# Patient Record
Sex: MALE | Race: OTHER | HISPANIC OR LATINO | URBAN - METROPOLITAN AREA
[De-identification: names, ages, dates, MRNs, and addresses within clinical notes are randomized per-mention and may not be internally consistent; named-entity substitution may affect disease eponyms.]

---

## 2019-08-30 ENCOUNTER — EMERGENCY (EMERGENCY)
Facility: HOSPITAL | Age: 39
LOS: 1 days | Discharge: HOME | End: 2019-08-30
Attending: EMERGENCY MEDICINE | Admitting: EMERGENCY MEDICINE
Payer: SELF-PAY

## 2019-08-30 VITALS
TEMPERATURE: 98 F | HEART RATE: 60 BPM | SYSTOLIC BLOOD PRESSURE: 127 MMHG | RESPIRATION RATE: 16 BRPM | OXYGEN SATURATION: 98 % | DIASTOLIC BLOOD PRESSURE: 78 MMHG

## 2019-08-30 VITALS
DIASTOLIC BLOOD PRESSURE: 72 MMHG | TEMPERATURE: 99 F | WEIGHT: 250 LBS | OXYGEN SATURATION: 99 % | HEART RATE: 71 BPM | RESPIRATION RATE: 16 BRPM | SYSTOLIC BLOOD PRESSURE: 113 MMHG | HEIGHT: 70 IN

## 2019-08-30 LAB
ALBUMIN SERPL ELPH-MCNC: 3.4 G/DL — SIGNIFICANT CHANGE UP (ref 3.3–5)
ALP SERPL-CCNC: 93 U/L — SIGNIFICANT CHANGE UP (ref 40–120)
ALT FLD-CCNC: 37 U/L — SIGNIFICANT CHANGE UP (ref 12–78)
ANION GAP SERPL CALC-SCNC: 9 MMOL/L — SIGNIFICANT CHANGE UP (ref 5–17)
APPEARANCE UR: CLEAR — SIGNIFICANT CHANGE UP
APTT BLD: 35.8 SEC — SIGNIFICANT CHANGE UP (ref 28.5–37)
AST SERPL-CCNC: 27 U/L — SIGNIFICANT CHANGE UP (ref 15–37)
BACTERIA # UR AUTO: ABNORMAL
BASOPHILS # BLD AUTO: 0.06 K/UL — SIGNIFICANT CHANGE UP (ref 0–0.2)
BASOPHILS NFR BLD AUTO: 0.5 % — SIGNIFICANT CHANGE UP (ref 0–2)
BILIRUB SERPL-MCNC: 0.5 MG/DL — SIGNIFICANT CHANGE UP (ref 0.2–1.2)
BILIRUB UR-MCNC: NEGATIVE — SIGNIFICANT CHANGE UP
BUN SERPL-MCNC: 17 MG/DL — SIGNIFICANT CHANGE UP (ref 7–23)
CALCIUM SERPL-MCNC: 8.9 MG/DL — SIGNIFICANT CHANGE UP (ref 8.5–10.1)
CHLORIDE SERPL-SCNC: 106 MMOL/L — SIGNIFICANT CHANGE UP (ref 96–108)
CO2 SERPL-SCNC: 27 MMOL/L — SIGNIFICANT CHANGE UP (ref 22–31)
COLOR SPEC: YELLOW — SIGNIFICANT CHANGE UP
CREAT SERPL-MCNC: 1 MG/DL — SIGNIFICANT CHANGE UP (ref 0.5–1.3)
DIFF PNL FLD: NEGATIVE — SIGNIFICANT CHANGE UP
EOSINOPHIL # BLD AUTO: 0.21 K/UL — SIGNIFICANT CHANGE UP (ref 0–0.5)
EOSINOPHIL NFR BLD AUTO: 1.9 % — SIGNIFICANT CHANGE UP (ref 0–6)
EPI CELLS # UR: SIGNIFICANT CHANGE UP
GLUCOSE SERPL-MCNC: 84 MG/DL — SIGNIFICANT CHANGE UP (ref 70–99)
GLUCOSE UR QL: NEGATIVE — SIGNIFICANT CHANGE UP
HCT VFR BLD CALC: 40.7 % — SIGNIFICANT CHANGE UP (ref 39–50)
HGB BLD-MCNC: 13.3 G/DL — SIGNIFICANT CHANGE UP (ref 13–17)
IMM GRANULOCYTES NFR BLD AUTO: 0.4 % — SIGNIFICANT CHANGE UP (ref 0–1.5)
INR BLD: 0.97 RATIO — SIGNIFICANT CHANGE UP (ref 0.88–1.16)
KETONES UR-MCNC: ABNORMAL
LEUKOCYTE ESTERASE UR-ACNC: ABNORMAL
LYMPHOCYTES # BLD AUTO: 29.2 % — SIGNIFICANT CHANGE UP (ref 13–44)
LYMPHOCYTES # BLD AUTO: 3.28 K/UL — SIGNIFICANT CHANGE UP (ref 1–3.3)
MCHC RBC-ENTMCNC: 29 PG — SIGNIFICANT CHANGE UP (ref 27–34)
MCHC RBC-ENTMCNC: 32.7 GM/DL — SIGNIFICANT CHANGE UP (ref 32–36)
MCV RBC AUTO: 88.9 FL — SIGNIFICANT CHANGE UP (ref 80–100)
MONOCYTES # BLD AUTO: 0.88 K/UL — SIGNIFICANT CHANGE UP (ref 0–0.9)
MONOCYTES NFR BLD AUTO: 7.8 % — SIGNIFICANT CHANGE UP (ref 2–14)
NEUTROPHILS # BLD AUTO: 6.77 K/UL — SIGNIFICANT CHANGE UP (ref 1.8–7.4)
NEUTROPHILS NFR BLD AUTO: 60.2 % — SIGNIFICANT CHANGE UP (ref 43–77)
NITRITE UR-MCNC: NEGATIVE — SIGNIFICANT CHANGE UP
NRBC # BLD: 0 /100 WBCS — SIGNIFICANT CHANGE UP (ref 0–0)
PH UR: 6.5 — SIGNIFICANT CHANGE UP (ref 5–8)
PLATELET # BLD AUTO: 386 K/UL — SIGNIFICANT CHANGE UP (ref 150–400)
POTASSIUM SERPL-MCNC: 4.2 MMOL/L — SIGNIFICANT CHANGE UP (ref 3.5–5.3)
POTASSIUM SERPL-SCNC: 4.2 MMOL/L — SIGNIFICANT CHANGE UP (ref 3.5–5.3)
PROT SERPL-MCNC: 8 G/DL — SIGNIFICANT CHANGE UP (ref 6–8.3)
PROT UR-MCNC: NEGATIVE — SIGNIFICANT CHANGE UP
PROTHROM AB SERPL-ACNC: 11 SEC — SIGNIFICANT CHANGE UP (ref 10–12.9)
RBC # BLD: 4.58 M/UL — SIGNIFICANT CHANGE UP (ref 4.2–5.8)
RBC # FLD: 13.4 % — SIGNIFICANT CHANGE UP (ref 10.3–14.5)
RBC CASTS # UR COMP ASSIST: SIGNIFICANT CHANGE UP /HPF (ref 0–4)
SODIUM SERPL-SCNC: 142 MMOL/L — SIGNIFICANT CHANGE UP (ref 135–145)
SP GR SPEC: 1.01 — SIGNIFICANT CHANGE UP (ref 1.01–1.02)
UROBILINOGEN FLD QL: NEGATIVE — SIGNIFICANT CHANGE UP
WBC # BLD: 11.25 K/UL — HIGH (ref 3.8–10.5)
WBC # FLD AUTO: 11.25 K/UL — HIGH (ref 3.8–10.5)
WBC UR QL: SIGNIFICANT CHANGE UP

## 2019-08-30 PROCEDURE — 85610 PROTHROMBIN TIME: CPT

## 2019-08-30 PROCEDURE — 81001 URINALYSIS AUTO W/SCOPE: CPT

## 2019-08-30 PROCEDURE — 96374 THER/PROPH/DIAG INJ IV PUSH: CPT | Mod: XU

## 2019-08-30 PROCEDURE — 99284 EMERGENCY DEPT VISIT MOD MDM: CPT | Mod: 25

## 2019-08-30 PROCEDURE — 74177 CT ABD & PELVIS W/CONTRAST: CPT | Mod: 26

## 2019-08-30 PROCEDURE — 85027 COMPLETE CBC AUTOMATED: CPT

## 2019-08-30 PROCEDURE — 74177 CT ABD & PELVIS W/CONTRAST: CPT

## 2019-08-30 PROCEDURE — 99285 EMERGENCY DEPT VISIT HI MDM: CPT

## 2019-08-30 PROCEDURE — 96361 HYDRATE IV INFUSION ADD-ON: CPT

## 2019-08-30 PROCEDURE — 80053 COMPREHEN METABOLIC PANEL: CPT

## 2019-08-30 PROCEDURE — 85730 THROMBOPLASTIN TIME PARTIAL: CPT

## 2019-08-30 PROCEDURE — 36415 COLL VENOUS BLD VENIPUNCTURE: CPT

## 2019-08-30 RX ORDER — METRONIDAZOLE 500 MG
1 TABLET ORAL
Qty: 30 | Refills: 0
Start: 2019-08-30 | End: 2019-09-08

## 2019-08-30 RX ORDER — KETOROLAC TROMETHAMINE 30 MG/ML
30 SYRINGE (ML) INJECTION ONCE
Refills: 0 | Status: DISCONTINUED | OUTPATIENT
Start: 2019-08-30 | End: 2019-08-30

## 2019-08-30 RX ORDER — SODIUM CHLORIDE 9 MG/ML
1000 INJECTION INTRAMUSCULAR; INTRAVENOUS; SUBCUTANEOUS ONCE
Refills: 0 | Status: COMPLETED | OUTPATIENT
Start: 2019-08-30 | End: 2019-08-30

## 2019-08-30 RX ORDER — METRONIDAZOLE 500 MG
500 TABLET ORAL ONCE
Refills: 0 | Status: COMPLETED | OUTPATIENT
Start: 2019-08-30 | End: 2019-08-30

## 2019-08-30 RX ORDER — CIPROFLOXACIN LACTATE 400MG/40ML
1 VIAL (ML) INTRAVENOUS
Qty: 20 | Refills: 0
Start: 2019-08-30 | End: 2019-09-08

## 2019-08-30 RX ORDER — CIPROFLOXACIN LACTATE 400MG/40ML
500 VIAL (ML) INTRAVENOUS ONCE
Refills: 0 | Status: COMPLETED | OUTPATIENT
Start: 2019-08-30 | End: 2019-08-30

## 2019-08-30 RX ADMIN — Medication 500 MILLIGRAM(S): at 22:44

## 2019-08-30 RX ADMIN — SODIUM CHLORIDE 1000 MILLILITER(S): 9 INJECTION INTRAMUSCULAR; INTRAVENOUS; SUBCUTANEOUS at 19:55

## 2019-08-30 RX ADMIN — Medication 30 MILLIGRAM(S): at 19:55

## 2019-08-30 RX ADMIN — SODIUM CHLORIDE 1000 MILLILITER(S): 9 INJECTION INTRAMUSCULAR; INTRAVENOUS; SUBCUTANEOUS at 22:37

## 2019-08-30 RX ADMIN — Medication 30 MILLIGRAM(S): at 22:37

## 2019-08-30 NOTE — ED PROVIDER NOTE - OBJECTIVE STATEMENT
38 yo male no pmhx c/o 2 weeks of LLQ, intermittent, subjective fevers, nonradiating, moderate to severe, no nausea/vomiting/diarrhea, noticed some blood in stool today.  Took some tylenol for pain with no relief. No dizziness.

## 2019-08-30 NOTE — ED ADULT NURSE NOTE - OBJECTIVE STATEMENT
received pt stable awake alert oriented x3 no distress c/o lt flank pain on and off for 2 weeks pt evaluated and denies nausea and vomiting labs and urine sent to lab

## 2019-08-30 NOTE — ED PROVIDER NOTE - PATIENT PORTAL LINK FT
You can access the FollowMyHealth Patient Portal offered by [facility] by registering at the following website: [email or web address]. By joining [employer]s FollowMyHealth portal, you will also be able to view your health information using other applications (apps) compatible with our system.

## 2019-08-30 NOTE — ED PROVIDER NOTE - NS ED ROS FT

## 2019-08-30 NOTE — ED ADULT NURSE REASSESSMENT NOTE - NS ED NURSE REASSESS COMMENT FT1
pt reavaluated and medicated with toradol 30mg ivp as ordered and ivf infusing well and  po contrast tolerated well awaiting ct scan

## 2019-08-30 NOTE — ED ADULT NURSE NOTE - NSIMPLEMENTINTERV_GEN_ALL_ED
Implemented All Universal Safety Interventions:  [place] to call system. Call bell, personal items and telephone within reach. Instruct patient to call for assistance. Room bathroom lighting operational. Non-slip footwear when patient is off stretcher. Physically safe environment: no spills, clutter or unnecessary equipment. Stretcher in lowest position, wheels locked, appropriate side rails in place.

## 2019-08-30 NOTE — ED PROVIDER NOTE - PHYSICAL EXAMINATION
Gen: Alert, NAD  Head/eyes: NC/AT, PERRL, EOMI  ENT: airway patent  Neck: supple, no tenderness/meningismus/JVD, Trachea midline  Pulm/lung: Bilateral clear BS, normal resp effort, no wheeze/stridor/retractions  CV/heart: RRR, no M/R/G, +2 dist pulses (radial, pedal DP/PT, popliteal)  GI/Abd: soft, +LLQ ttp/ND, +BS, no guarding/rebound tenderness  Musculoskeletal: no edema/erythema/cyanosis, FROM in all extremities, no C/T/L spine ttp  Skin: no rash, no vesicles, no petechaie, no ecchymosis, no swelling  Neuro: AAOx3, CN 2-12 intact, normal sensation, 5/5 motor strength in all extremities, normal gait, no dysmetria

## 2019-08-30 NOTE — ED PROVIDER NOTE - NSFOLLOWUPINSTRUCTIONS_ED_ALL_ED_FT
1) Follow-up with your Primary Medical Doctor and Dr. Howard. Call today / next business day for prompt follow-up.  2) Return to Emergency room for any worsening or persistent pain, weakness, fever, or any other concerning symptoms.  3) See attached instruction sheets for additional information, including information regarding signs and symptoms to look out for, reasons to seek immediate care and other important instructions.  4) Take cipro 500mg every 12 hours for 10 days.  5) Take flagyl 500mg every 8 hours for 10 days.

## 2019-08-30 NOTE — ED ADULT NURSE REASSESSMENT NOTE - GENERAL PATIENT STATE
----- Message from Carolin Kaplan sent at 7/22/2019 10:04 AM CDT -----  Contact: pt  Pt would like to be called back regarding his ERCT     Pt can be reached at [phone]   comfortable appearance/cooperative

## 2023-03-18 ENCOUNTER — EMERGENCY (EMERGENCY)
Facility: HOSPITAL | Age: 43
LOS: 1 days | Discharge: HOME | End: 2023-03-18
Attending: INTERNAL MEDICINE | Admitting: INTERNAL MEDICINE
Payer: COMMERCIAL

## 2023-03-18 VITALS
HEART RATE: 69 BPM | SYSTOLIC BLOOD PRESSURE: 141 MMHG | OXYGEN SATURATION: 97 % | DIASTOLIC BLOOD PRESSURE: 85 MMHG | RESPIRATION RATE: 16 BRPM | TEMPERATURE: 98 F

## 2023-03-18 VITALS
OXYGEN SATURATION: 96 % | RESPIRATION RATE: 15 BRPM | HEIGHT: 70 IN | DIASTOLIC BLOOD PRESSURE: 90 MMHG | SYSTOLIC BLOOD PRESSURE: 150 MMHG | TEMPERATURE: 98 F | HEART RATE: 74 BPM | WEIGHT: 250 LBS

## 2023-03-18 PROCEDURE — 99053 MED SERV 10PM-8AM 24 HR FAC: CPT

## 2023-03-18 PROCEDURE — 73090 X-RAY EXAM OF FOREARM: CPT

## 2023-03-18 PROCEDURE — 73110 X-RAY EXAM OF WRIST: CPT

## 2023-03-18 PROCEDURE — 73110 X-RAY EXAM OF WRIST: CPT | Mod: 26,RT

## 2023-03-18 PROCEDURE — 99284 EMERGENCY DEPT VISIT MOD MDM: CPT | Mod: 25

## 2023-03-18 PROCEDURE — 73090 X-RAY EXAM OF FOREARM: CPT | Mod: 26,LT

## 2023-03-18 PROCEDURE — 99284 EMERGENCY DEPT VISIT MOD MDM: CPT

## 2023-03-18 RX ORDER — OXYCODONE AND ACETAMINOPHEN 5; 325 MG/1; MG/1
1 TABLET ORAL ONCE
Refills: 0 | Status: DISCONTINUED | OUTPATIENT
Start: 2023-03-18 | End: 2023-03-18

## 2023-03-18 RX ADMIN — OXYCODONE AND ACETAMINOPHEN 1 TABLET(S): 5; 325 TABLET ORAL at 05:48

## 2023-03-18 NOTE — ED ADULT NURSE NOTE - DISTAL EXTREMITY COLOR
1st call unable to leave a vm please try again or of mom calls back schedule 20 min asthma  f/u     color consistent with ethnicity/race

## 2023-03-18 NOTE — ED ADULT NURSE NOTE - CHIEF COMPLAINT QUOTE
Pt fell off the truck and injured right arm. Pt went to a hospital in [place] and they gave him a splint but pt states it is too tight and he feels like he is losing circulation. Pt is able to wiggle fingers and skin perfusion is good.

## 2023-03-18 NOTE — ED PROVIDER NOTE - MUSCULOSKELETAL, MLM
left wrist ,  The ace bandage was removed , the splint was loosened and the ace reapplied with good patient comfort,

## 2023-03-18 NOTE — ED ADULT TRIAGE NOTE - CHIEF COMPLAINT QUOTE
Pt fell off the truck and injured right arm. Pt went to a hospital in [place] and they gave him a splint but pt states it is too tight and he feels like he is losing circulation. Pt is able to wiggle fingers and skin perfusion is good.

## 2023-03-18 NOTE — ED PROVIDER NOTE - CARE PROVIDER_API CALL
MARQUITA CRAMER  Orthopaedic Surgery  [address], [address], [place] [address]  Phone: [phone]  Fax: ([phone]  Follow Up Time: 1-3 Days    Marquita Cramer)  Orthopedics  [address], [address]  [place], [place] [id number]  Phone: [phone]  Fax: [phone]  Follow Up Time: 1-3 Days

## 2023-03-18 NOTE — ED ADULT NURSE NOTE - SCORE
Urinalysis today  Take tylenol up to 3 g daily as needed for shoulder pain  If no improvement, we will refer to physical therapy  Continue the same medication  Continue the blood thinner  For precaution  Stay hydrated  Call if any questions or concerns  See me in 3 months for wellness examination earlier if needed.  With urine microalbumin.  
1

## 2023-03-18 NOTE — ED PROVIDER NOTE - PATIENT PORTAL LINK FT
You can access the FollowMyHealth Patient Portal offered by [facility] by registering at the following website: [email or web address]. By joining [employer]’s FollowMyHealth portal, you will also be able to view your health information using other applications (apps) compatible with our system.

## 2023-03-18 NOTE — ED PROVIDER NOTE - CLINICAL SUMMARY MEDICAL DECISION MAKING FREE TEXT BOX
41 y/o male with recent left wrist fx, C/O tight splint, The ace bandage was removed , the splint was loosened and the ace reapplied with good patient comfort, X-R determined good anatomic alignment , discharged and referred to O/P ortho

## 2023-03-18 NOTE — ED PROVIDER NOTE - PROVIDER TOKENS
PROVIDER:[TOKEN:[[id number]],FOLLOWUP:[1-3 Days]],PROVIDER:[TOKEN:[[id number]],FOLLOWUP:[1-3 Days]]

## 2023-03-18 NOTE — ED PROVIDER NOTE - OBJECTIVE STATEMENT
Pt fell off the truck and injured right arm. Pt went to a hospital in [place] and they gave him a splint but pt states it is too tight and he feels like he is losing circulation. Pt is able to wiggle fingers and skin perfusion is good.  arm pain/injury 43 y/o male with recent left wrist fx, C/O tight splint, The ace bandage was removed , the splint was loosened and the ace reapplied with good patient comfort, X-R determined good anatomic alignment

## 2023-03-18 NOTE — ED PROVIDER NOTE - NSFOLLOWUPINSTRUCTIONS_ED_ALL_ED_FT
manten tu brazo elevado sobre el faye y sobre la almohada   aplicar bolsa de hielo  no quites el yeso   seguimiento con ortopedista  use la eslinga del brazo para mantenerse elevada

## 2023-03-18 NOTE — ED ADULT NURSE NOTE - OBJECTIVE STATEMENT
pt a/o x 4 with a calm affect c/o right arm pain after breaking arm and having it placed in a sling in [place] recently.  pt states it feels very tight, pulses are present and skin color is normal with no loss of sensation.  MD at bedside to re-splint arm.

## 2023-03-29 ENCOUNTER — APPOINTMENT (OUTPATIENT)
Dept: ORTHOPEDIC SURGERY | Facility: CLINIC | Age: 43
End: 2023-03-29
Payer: OTHER MISCELLANEOUS

## 2023-03-29 ENCOUNTER — NON-APPOINTMENT (OUTPATIENT)
Age: 43
End: 2023-03-29

## 2023-03-29 VITALS
WEIGHT: 250 LBS | HEIGHT: 70 IN | SYSTOLIC BLOOD PRESSURE: 143 MMHG | DIASTOLIC BLOOD PRESSURE: 88 MMHG | HEART RATE: 63 BPM | BODY MASS INDEX: 35.79 KG/M2

## 2023-03-29 PROCEDURE — 73110 X-RAY EXAM OF WRIST: CPT | Mod: RT

## 2023-03-29 PROCEDURE — 99204 OFFICE O/P NEW MOD 45 MIN: CPT

## 2023-03-29 NOTE — DISCUSSION/SUMMARY
[FreeTextEntry1] : He has findings consistent with a highly comminuted intra-articular right distal radius fracture after an injury at work on 3/17/2023.  The fracture was reduced.  There is significant comminution and intra-articular displacement and this is a highly unstable fracture.\par \par I had a discussion with the patient regarding today's visit, the prognosis of this diagnosis, and treatment recommendations and options.\par \par At this time, we discussed treatment options of operative and nonoperative management. I did tell him that given the comminution and displacement of the fracture, I would recommend ORIF.  This is based upon his age and the comminution and intra-articular displacement.  He would like to think about this but he understands that I have recommended surgery.  I told him that I would recommend we obtain authorization through Workmen's Compensation and then discuss this further.  He is in agreement.\par \par In the interim, he was instructed on maintenance of the sugar tong splint and on proper protection and splint care. Furthermore, he was instructed to begin flexion and extension exercises of the digits as he has developed a great deal of stiffness to the digits since the injury occurred and is losing motion/function. Finally, at his request due to pain, he was prescribed course of Celebrex 200 mg, BID with meals. I warned about potential GI side effects.\par \par He has agreed to the above plan of management and has expressed full understanding.  All questions were fully answered to their satisfaction. \par \par My cumulative time spent on this visit included: Preparation for the visit, review of the medical records, review of pertinent diagnostic studies, examination and counseling of the patient on the above diagnosis, treatment plan and prognosis, orders of diagnostic tests, medication and/or appropriate procedures and documentation in the medical records of today's visit.

## 2023-03-29 NOTE — END OF VISIT
[FreeTextEntry3] : This note was written by Andreia Paulino on 03/29/2023 acting solely as a scribe for Dr. Judson Pelletier.\par  \par All medical record entries made by the Scribe were at my, Dr. Judson Pelletier, direction and personally dictated by me on 03/29/2023. I have personally reviewed the chart and agree that the record accurately reflects my personal performance of the history, physical exam, assessment and plan.

## 2023-03-29 NOTE — ADDENDUM
[FreeTextEntry1] : I, Andreia Paulino, acted solely as a scribe for Dr. Pelletier on this date on 03/29/2023.

## 2023-03-29 NOTE — HISTORY OF PRESENT ILLNESS
[Right] : right hand dominant [Has the patient missed work because of the injury/illness?] : The patient has missed work because of the injury/illness. [No] : The patient is currently not working. [FreeTextEntry1] : \par Workmen's Compensation case\par \par Date of accident: 03/17/2023\par Working: No\par Degree of Disability: 100%\par \par He comes in today for evaluation of a right wrist injury sustained at work on 3/17/23 while in [place]. He was in his truck attempting to grab a case from a wood pallet and accidentally stepped on the wood, causing him to trip and fall. He landed on his hand resulting in the injury. He was seen that same day at [facility] in [place], [place]. He was closed reduced and splinted. He returned to [place] and the next day presented to [place] ED due to discomfort in the sugar tong splint. He complains of a throbbing pain as well as numbness and tingling. He has swelling to the digits and limited digital motion. He rates his pain as a 6 out of 10 at this time.  [FreeTextEntry2] : He is a .

## 2023-03-29 NOTE — PHYSICAL EXAM
[de-identified] : - Constitutional: This is a male in no obvious distress.  \par - Psych: Patient is alert and oriented to person, place and time.  Patient has a normal mood and affect.\par - Cardiovascular: Normal pulses throughout the upper extremities.  No significant varicosities are noted in the upper extremities. \par - Neuro: Strength and sensation are intact throughout the upper extremities.  Patient has normal coordination.\par - Respiratory:  Patient exhibits no evidence of shortness of breath or difficulty breathing.\par - Skin: No rashes, lesions, or other abnormalities are noted in the upper extremities.\par \par --- \par \par Examination of his right wrist and hand demonstrates a sugar-tong splint.  He has swelling of the digits.  His limitation of flexion and extension of the digits.  He has intact sensation to light touch distally along the radial, ulnar and median nerve distributions. [de-identified] : PA, lateral, and oblique radiographs of the right wrist demonstrate a comminuted intra-articular distal radius fracture.  There is significant comminution and a displaced intra-articular fragment ulnarly.

## 2023-04-05 ENCOUNTER — APPOINTMENT (OUTPATIENT)
Dept: ORTHOPEDIC SURGERY | Facility: CLINIC | Age: 43
End: 2023-04-05
Payer: OTHER MISCELLANEOUS

## 2023-04-05 PROCEDURE — 99214 OFFICE O/P EST MOD 30 MIN: CPT

## 2023-04-05 PROCEDURE — 73110 X-RAY EXAM OF WRIST: CPT | Mod: RT

## 2023-04-05 NOTE — PHYSICAL EXAM
[de-identified] : - Constitutional: This is a male in no obvious distress.  \par - Psych: Patient is alert and oriented to person, place and time.  Patient has a normal mood and affect.\par - Cardiovascular: Normal pulses throughout the upper extremities.  No significant varicosities are noted in the upper extremities. \par - Neuro: Strength and sensation are intact throughout the upper extremities.  Patient has normal coordination.\par - Respiratory:  Patient exhibits no evidence of shortness of breath or difficulty breathing.\par - Skin: No rashes, lesions, or other abnormalities are noted in the upper extremities.\par \par --- \par \par Examination of his right wrist and hand demonstrates a sugar-tong splint.  He has decreased swelling of the digits.  His improved flexion and extension of the digits.  He has intact sensation to light touch distally along the radial, ulnar and median nerve distributions. [de-identified] : Repeat PA, lateral, and oblique radiographs of the right wrist demonstrate a comminuted intra-articular distal radius fracture.  There is significant comminution and a displaced intra-articular fragment ulnarly.

## 2023-04-05 NOTE — DISCUSSION/SUMMARY
[FreeTextEntry1] : I had a discussion regarding today's visit, the diagnosis and treatment recommendations and options.  We also discussed changes since the last visit.  At this time, I again discussed the xrays in great detail with him and told him that I would recommend ORIF, given the displacement and comminution of the fracture. I told him that the outcome will be significantly better with surgery than without, again given the nature of the injury. We discussed the postoperative protocols and expected recovery, to which I told him he may require cast immobilization postoperatively and may be out of work for at least 2 months. He stated he understands and has agreed to proceed with ORIF right distal radius fracture. Finally, I did tell him that worker's compensation has not yet authorized the surgery but that my office will again reach again and will notify him when the surgery is authorized.  We will call Workmen's Compensation, as this is relatively emergent.\par \par -  The nature and purposes of the operation/procedure was discussed in detail.  I discussed the surgical procedure in detail, as well as the expected postoperative recovery and outcome\par -  Possible risks, benefits, and complications (from known and unknown causes) of the procedure were discussed in detail.  \par -  Possible non-operative alternatives to the proposed treatment were discussed in detail.  \par -  He was told that possible risks/complications include, but are not limited to:  Infection, nerve or vessel injury, stiffness, painful scar, poor outcome, need for additional surgical procedures, and other unforeseen complications. I also discussed additional potential risks inherent in ORIF of a distal radius fracture with a volar plate. The patient understands that there is a risk that the plate may need to be removed in the future, if it results in pain or other hardware-related problems. I also discussed the potential of tendon related problems secondary to volar plates, including, but not limited to, tendinitis, tendinopathy, and even tendon rupture at a late date.  \par -  Finally, the possibility of an "unsuccessful outcome," despite "successful surgery," was discussed with the patient.  \par -  The patient fully understands these risks and wishes to proceed.  \par -  I had a lengthy discussion with the patient regarding today's visit, the diagnosis, and my surgical treatment recommendations.  The patient has agreed to this plan of management and has expressed full understanding.  All questions were fully answered to the patient's satisfaction. \par \par My cumulative time spent on today's visit was greater than 30 minutes and included: Preparation for the visit, review of the medical records, review of pertinent diagnostic studies, examination and counseling of the patient on the above diagnosis, treatment plan and prognosis, orders of diagnostic tests, medications and/or appropriate procedures and documentation in the medical records of today's visit.

## 2023-04-05 NOTE — END OF VISIT
[FreeTextEntry3] : This note was written by Andreia Paulino on 04/05/2023 acting solely as a scribe for Dr. Judson Pelletier.\par  \par All medical record entries made by the Scribe were at my, Dr. Judson Pelletier, direction and personally dictated by me on 04/05/2023. I have personally reviewed the chart and agree that the record accurately reflects my personal performance of the history, physical exam, assessment and plan.

## 2023-04-05 NOTE — HISTORY OF PRESENT ILLNESS
[Right] : right hand dominant [Has the patient missed work because of the injury/illness?] : The patient has missed work because of the injury/illness. [No] : The patient is currently not working. [FreeTextEntry1] : \par Workmen's Compensation case\par \par Date of accident: 03/17/2023\par Working: No\par Degree of Disability: 100%\par \par Follow-up regarding right distal radius fracture after an accident at work on 3/17/2023.  See note from when he was seen in the office 1 week ago.  I recommended open reduction and internal fixation.  He was not certain whether or not he wanted to proceed with surgery so I recommended requesting authorization for surgery and following up today.\par \par He is having continued pain, which is exacerbated with use. He notes he is still unsure if he would like surgery and would like to have the wrist xray'd again today to see how things look. He rates his pain as a 6 out of 10 at this time. [FreeTextEntry2] : He is a .

## 2023-04-10 ENCOUNTER — NON-APPOINTMENT (OUTPATIENT)
Age: 43
End: 2023-04-10

## 2023-04-11 ENCOUNTER — OUTPATIENT (OUTPATIENT)
Dept: OUTPATIENT SERVICES | Facility: HOSPITAL | Age: 43
LOS: 1 days | End: 2023-04-11
Payer: COMMERCIAL

## 2023-04-11 VITALS
TEMPERATURE: 98 F | DIASTOLIC BLOOD PRESSURE: 90 MMHG | WEIGHT: 300.05 LBS | SYSTOLIC BLOOD PRESSURE: 150 MMHG | HEIGHT: 70 IN | OXYGEN SATURATION: 98 % | HEART RATE: 82 BPM | RESPIRATION RATE: 12 BRPM

## 2023-04-11 DIAGNOSIS — S52.501A UNSPECIFIED FRACTURE OF THE LOWER END OF RIGHT RADIUS, INITIAL ENCOUNTER FOR CLOSED FRACTURE: ICD-10-CM

## 2023-04-11 DIAGNOSIS — Z01.818 ENCOUNTER FOR OTHER PREPROCEDURAL EXAMINATION: ICD-10-CM

## 2023-04-11 LAB
ALBUMIN SERPL ELPH-MCNC: 4 G/DL — SIGNIFICANT CHANGE UP (ref 3.3–5)
ALP SERPL-CCNC: 99 U/L — SIGNIFICANT CHANGE UP (ref 30–120)
ALT FLD-CCNC: 72 U/L DA — HIGH (ref 10–60)
ANION GAP SERPL CALC-SCNC: 12 MMOL/L — SIGNIFICANT CHANGE UP (ref 5–17)
AST SERPL-CCNC: 39 U/L — SIGNIFICANT CHANGE UP (ref 10–40)
BILIRUB SERPL-MCNC: 0.7 MG/DL — SIGNIFICANT CHANGE UP (ref 0.2–1.2)
BUN SERPL-MCNC: 11 MG/DL — SIGNIFICANT CHANGE UP (ref 7–23)
CALCIUM SERPL-MCNC: 9.5 MG/DL — SIGNIFICANT CHANGE UP (ref 8.4–10.5)
CHLORIDE SERPL-SCNC: 102 MMOL/L — SIGNIFICANT CHANGE UP (ref 96–108)
CO2 SERPL-SCNC: 27 MMOL/L — SIGNIFICANT CHANGE UP (ref 22–31)
CREAT SERPL-MCNC: 0.81 MG/DL — SIGNIFICANT CHANGE UP (ref 0.5–1.3)
EGFR: 112 ML/MIN/1.73M2 — SIGNIFICANT CHANGE UP
GLUCOSE SERPL-MCNC: 98 MG/DL — SIGNIFICANT CHANGE UP (ref 70–99)
HCT VFR BLD CALC: 44.6 % — SIGNIFICANT CHANGE UP (ref 39–50)
HGB BLD-MCNC: 14.6 G/DL — SIGNIFICANT CHANGE UP (ref 13–17)
MCHC RBC-ENTMCNC: 29 PG — SIGNIFICANT CHANGE UP (ref 27–34)
MCHC RBC-ENTMCNC: 32.7 GM/DL — SIGNIFICANT CHANGE UP (ref 32–36)
MCV RBC AUTO: 88.7 FL — SIGNIFICANT CHANGE UP (ref 80–100)
NRBC # BLD: 0 /100 WBCS — SIGNIFICANT CHANGE UP (ref 0–0)
PLATELET # BLD AUTO: 405 K/UL — HIGH (ref 150–400)
POTASSIUM SERPL-MCNC: 3.8 MMOL/L — SIGNIFICANT CHANGE UP (ref 3.5–5.3)
POTASSIUM SERPL-SCNC: 3.8 MMOL/L — SIGNIFICANT CHANGE UP (ref 3.5–5.3)
PROT SERPL-MCNC: 9.4 G/DL — HIGH (ref 6–8.3)
RBC # BLD: 5.03 M/UL — SIGNIFICANT CHANGE UP (ref 4.2–5.8)
RBC # FLD: 13 % — SIGNIFICANT CHANGE UP (ref 10.3–14.5)
SODIUM SERPL-SCNC: 141 MMOL/L — SIGNIFICANT CHANGE UP (ref 135–145)
WBC # BLD: 9.24 K/UL — SIGNIFICANT CHANGE UP (ref 3.8–10.5)
WBC # FLD AUTO: 9.24 K/UL — SIGNIFICANT CHANGE UP (ref 3.8–10.5)

## 2023-04-11 PROCEDURE — 93010 ELECTROCARDIOGRAM REPORT: CPT

## 2023-04-11 PROCEDURE — 36415 COLL VENOUS BLD VENIPUNCTURE: CPT

## 2023-04-11 PROCEDURE — 85027 COMPLETE CBC AUTOMATED: CPT

## 2023-04-11 PROCEDURE — 80053 COMPREHEN METABOLIC PANEL: CPT

## 2023-04-11 PROCEDURE — 93005 ELECTROCARDIOGRAM TRACING: CPT

## 2023-04-11 PROCEDURE — G0463: CPT

## 2023-04-11 NOTE — H&P PST ADULT - MUSCULOSKELETAL COMMENTS
right forearm splinted; able to move all right fingers right forearm fracture; splinted arm supported by shoulder sling

## 2023-04-11 NOTE — H&P PST ADULT - HISTORY OF PRESENT ILLNESS
43 yo male reports fall injury 3/17/2023 which resulted in right distal radius fracture.  He reports pain and swelling and is able to move all fingers.  He is scheduled for open reduction internal fixation of displaced intra-articular right distal radius fracture with greater than 3 intra-articular fragments on 4/13/2023 @ [facility].

## 2023-04-11 NOTE — H&P PST ADULT - MUSCULOSKELETAL
details… no joint swelling/no joint erythema/no joint warmth/no calf tenderness/decreased ROM due to pain/no chest wall tenderness

## 2023-04-11 NOTE — H&P PST ADULT - NSANTHOSAYNRD_GEN_A_CORE
No. EN screening performed.  STOP BANG Legend: 0-2 = LOW Risk; 3-4 = INTERMEDIATE Risk; 5-8 = HIGH Risk

## 2023-04-11 NOTE — H&P PST ADULT - NSICDXFAMILYHX_GEN_ALL_CORE_FT
FAMILY HISTORY:  Mother  Still living? Yes, Estimated age: Age Unknown  Family history of ovarian cancer, Age at diagnosis: Age Unknown

## 2023-04-11 NOTE — H&P PST ADULT - ASSESSMENT
43 yo male is scheduled for open reduction internal fixation of displaced intra-articular right distal radius fracture with greater than 3 intra-articular fragments on 4/13/2023

## 2023-04-11 NOTE — H&P PST ADULT - NSICDXPASTMEDICALHX_GEN_ALL_CORE_FT
PAST MEDICAL HISTORY:  Distal radius fracture, right     Morbid obesity with BMI of 40.0-44.9, adult     Uses Kiswahili as primary spoken language

## 2023-04-11 NOTE — H&P PST ADULT - PROBLEM SELECTOR PLAN 1
Open reduction internal fixation of displaced intra-articular right distal radius fracture with greater than 3 intra-articular fragments is planned for 4/13/2023  Diagnostic testing performed  Patient states he does not have a PCP for medical clearance and Dr Pelletier's office was notified.  Pre op instructions were reviewed using   Best wishes offered
bel smith

## 2023-04-12 ENCOUNTER — TRANSCRIPTION ENCOUNTER (OUTPATIENT)
Age: 43
End: 2023-04-12

## 2023-04-13 ENCOUNTER — OUTPATIENT (OUTPATIENT)
Dept: OUTPATIENT SERVICES | Facility: HOSPITAL | Age: 43
LOS: 1 days | Discharge: HOME | End: 2023-04-13
Payer: COMMERCIAL

## 2023-04-13 ENCOUNTER — TRANSCRIPTION ENCOUNTER (OUTPATIENT)
Age: 43
End: 2023-04-13

## 2023-04-13 ENCOUNTER — APPOINTMENT (OUTPATIENT)
Dept: ORTHOPEDIC SURGERY | Facility: HOSPITAL | Age: 43
End: 2023-04-13

## 2023-04-13 VITALS
OXYGEN SATURATION: 97 % | HEIGHT: 70 IN | HEART RATE: 69 BPM | RESPIRATION RATE: 18 BRPM | DIASTOLIC BLOOD PRESSURE: 74 MMHG | TEMPERATURE: 97 F | SYSTOLIC BLOOD PRESSURE: 123 MMHG | WEIGHT: 304.24 LBS

## 2023-04-13 VITALS
DIASTOLIC BLOOD PRESSURE: 74 MMHG | OXYGEN SATURATION: 95 % | RESPIRATION RATE: 16 BRPM | HEART RATE: 84 BPM | SYSTOLIC BLOOD PRESSURE: 121 MMHG

## 2023-04-13 DIAGNOSIS — S52.501A UNSPECIFIED FRACTURE OF THE LOWER END OF RIGHT RADIUS, INITIAL ENCOUNTER FOR CLOSED FRACTURE: ICD-10-CM

## 2023-04-13 DIAGNOSIS — Z01.818 ENCOUNTER FOR OTHER PREPROCEDURAL EXAMINATION: ICD-10-CM

## 2023-04-13 PROCEDURE — C1889: CPT

## 2023-04-13 PROCEDURE — 25575 OPTX RDL&ULN SHFT FX RDS&ULN: CPT | Mod: AS,RT

## 2023-04-13 PROCEDURE — C1713: CPT

## 2023-04-13 PROCEDURE — 25609 OPTX DST RD XART FX/EP SEP3+: CPT | Mod: RT

## 2023-04-13 PROCEDURE — 76000 FLUOROSCOPY <1 HR PHYS/QHP: CPT

## 2023-04-13 PROCEDURE — 99244 OFF/OP CNSLTJ NEW/EST MOD 40: CPT

## 2023-04-13 DEVICE — SCREW CORT 2.5X20MM: Type: IMPLANTABLE DEVICE | Site: RIGHT | Status: FUNCTIONAL

## 2023-04-13 DEVICE — SCREW CORT 2.5X13MM: Type: IMPLANTABLE DEVICE | Site: RIGHT | Status: FUNCTIONAL

## 2023-04-13 DEVICE — SCREW CORT 2.5X18MM: Type: IMPLANTABLE DEVICE | Site: RIGHT | Status: FUNCTIONAL

## 2023-04-13 DEVICE — SCREW CORT 2.5X14MM: Type: IMPLANTABLE DEVICE | Site: RIGHT | Status: FUNCTIONAL

## 2023-04-13 DEVICE — SCREW TRILOCK 2.5X20MM: Type: IMPLANTABLE DEVICE | Site: RIGHT | Status: FUNCTIONAL

## 2023-04-13 DEVICE — PLATE TRILOCK DIST RAD VOLAR NRRW 12H RT: Type: IMPLANTABLE DEVICE | Site: RIGHT | Status: FUNCTIONAL

## 2023-04-13 DEVICE — SCREW TRILOCK 2.5X14MM: Type: IMPLANTABLE DEVICE | Site: RIGHT | Status: FUNCTIONAL

## 2023-04-13 DEVICE — SCREW TRILOCK 2.5X18MM: Type: IMPLANTABLE DEVICE | Site: RIGHT | Status: FUNCTIONAL

## 2023-04-13 DEVICE — SCREW TRILOCK 2.5X22MM: Type: IMPLANTABLE DEVICE | Site: RIGHT | Status: FUNCTIONAL

## 2023-04-13 DEVICE — K-WIRE MEDARTIS (SMOOTH) SINGLE TROCAR 1.6MM X 150MM: Type: IMPLANTABLE DEVICE | Site: RIGHT | Status: FUNCTIONAL

## 2023-04-13 DEVICE — IMPLANTABLE DEVICE: Type: IMPLANTABLE DEVICE | Site: RIGHT | Status: FUNCTIONAL

## 2023-04-13 RX ORDER — CHLORHEXIDINE GLUCONATE 213 G/1000ML
1 SOLUTION TOPICAL ONCE
Refills: 0 | Status: COMPLETED | OUTPATIENT
Start: 2023-04-13 | End: 2023-04-13

## 2023-04-13 RX ORDER — ONDANSETRON 8 MG/1
4 TABLET, FILM COATED ORAL ONCE
Refills: 0 | Status: DISCONTINUED | OUTPATIENT
Start: 2023-04-13 | End: 2023-04-13

## 2023-04-13 RX ORDER — HYDROMORPHONE HYDROCHLORIDE 2 MG/ML
0.5 INJECTION INTRAMUSCULAR; INTRAVENOUS; SUBCUTANEOUS
Refills: 0 | Status: DISCONTINUED | OUTPATIENT
Start: 2023-04-13 | End: 2023-04-13

## 2023-04-13 RX ORDER — APREPITANT 80 MG/1
40 CAPSULE ORAL ONCE
Refills: 0 | Status: COMPLETED | OUTPATIENT
Start: 2023-04-13 | End: 2023-04-13

## 2023-04-13 RX ORDER — CEPHALEXIN 500 MG
1 CAPSULE ORAL
Qty: 8 | Refills: 0
Start: 2023-04-13 | End: 2023-04-14

## 2023-04-13 RX ORDER — OXYCODONE AND ACETAMINOPHEN 5; 325 MG/1; MG/1
1 TABLET ORAL
Qty: 20 | Refills: 0
Start: 2023-04-13

## 2023-04-13 RX ORDER — CEFAZOLIN SODIUM 1 G
3000 VIAL (EA) INJECTION ONCE
Refills: 0 | Status: COMPLETED | OUTPATIENT
Start: 2023-04-13 | End: 2023-04-13

## 2023-04-13 RX ORDER — SODIUM CHLORIDE 9 MG/ML
1000 INJECTION, SOLUTION INTRAVENOUS
Refills: 0 | Status: DISCONTINUED | OUTPATIENT
Start: 2023-04-13 | End: 2023-04-13

## 2023-04-13 RX ORDER — ACETAMINOPHEN 500 MG
1000 TABLET ORAL ONCE
Refills: 0 | Status: COMPLETED | OUTPATIENT
Start: 2023-04-13 | End: 2023-04-13

## 2023-04-13 RX ORDER — HYDROMORPHONE HYDROCHLORIDE 2 MG/ML
0.25 INJECTION INTRAMUSCULAR; INTRAVENOUS; SUBCUTANEOUS
Refills: 0 | Status: DISCONTINUED | OUTPATIENT
Start: 2023-04-13 | End: 2023-04-13

## 2023-04-13 RX ORDER — CELECOXIB 200 MG/1
1 CAPSULE ORAL
Qty: 10 | Refills: 0
Start: 2023-04-13

## 2023-04-13 RX ADMIN — CHLORHEXIDINE GLUCONATE 1 APPLICATION(S): 213 SOLUTION TOPICAL at 12:37

## 2023-04-13 RX ADMIN — APREPITANT 40 MILLIGRAM(S): 80 CAPSULE ORAL at 12:37

## 2023-04-13 NOTE — ASU DISCHARGE PLAN (ADULT/PEDIATRIC) - PROVIDER TOKENS
PROVIDER:[TOKEN:[[id number]],SCHEDULEDAPPT:[04/21/2023]] PROVIDER:[TOKEN:[[id number]],SCHEDULEDAPPT:[04/19/2023]]

## 2023-04-13 NOTE — ASU PATIENT PROFILE, ADULT - FALL HARM RISK - RISK INTERVENTIONS

## 2023-04-13 NOTE — ASU DISCHARGE PLAN (ADULT/PEDIATRIC) - ASU DC SPECIAL INSTRUCTIONSFT
Keep splint intact and clean  sling for comfort Keep splint intact and clean until seen in the office  sling for comfort
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
## 2023-04-13 NOTE — ASU DISCHARGE PLAN (ADULT/PEDIATRIC) - CARE PROVIDER_API CALL
Judson Pelletier)  Orthopaedic Surgery; Surgery of the Hand  [address], [facility] [address]  [place] [address]  Phone: [phone]  Fax: [phone]  Scheduled Appointment: 04/21/2023   Judson Pelletier)  Orthopaedic Surgery; Surgery of the Hand  [address] [facility]  [place] [address]  Phone: [phone]  Fax: [phone]  Scheduled Appointment: 04/19/2023

## 2023-04-13 NOTE — CONSULT NOTE ADULT - ASSESSMENT
R distal radius fracture  - planned ORIF today   - No absolute contraindications for surgery, patient is medically clear to proceed  - pain control as per ortho team  - IV fluid and postop antibiotics as per ortho team  - PT/OT consult  - VTE prophylaxis as per ortho     R distal radius fracture  - planned ORIF today   - No absolute contraindications for surgery, patient is medically clear to proceed  - pain control as per ortho team  - IV fluid and postop antibiotics as per ortho team  - PT/OT consult  - VTE prophylaxis as per ortho    Left posterior fasicular block  - asymptomatic, incidental finding on EKG  - no contraindication for surgery, patient has no deficiency in METS  - patient should establish care with a PCP and have outpatient lipid panel and proper followup for weight loss

## 2023-04-13 NOTE — ASU PATIENT PROFILE, ADULT - NSICDXPASTMEDICALHX_GEN_ALL_CORE_FT
PAST MEDICAL HISTORY:  Distal radius fracture, right     Morbid obesity with BMI of 40.0-44.9, adult     Uses Icelandic as primary spoken language

## 2023-04-13 NOTE — BRIEF OPERATIVE NOTE - NSICDXBRIEFPROCEDURE_GEN_ALL_CORE_FT
PROCEDURES:  Open reduction and internal fixation of fracture of right distal radius and ulna 13-Apr-2023 12:37:21  Marti Fajardo

## 2023-04-13 NOTE — ASU DISCHARGE PLAN (ADULT/PEDIATRIC) - NS MD DC FALL RISK RISK
For information on Fall & Injury Prevention, visit: [email or web address] OR  [email or web address] OR  https://www.cdc.gov/steadi/patient.html

## 2023-04-19 ENCOUNTER — APPOINTMENT (OUTPATIENT)
Dept: ORTHOPEDIC SURGERY | Facility: CLINIC | Age: 43
End: 2023-04-19
Payer: OTHER MISCELLANEOUS

## 2023-04-19 PROBLEM — Z78.9 OTHER SPECIFIED HEALTH STATUS: Chronic | Status: ACTIVE | Noted: 2023-04-11

## 2023-04-19 PROBLEM — S52.501A UNSPECIFIED FRACTURE OF THE LOWER END OF RIGHT RADIUS, INITIAL ENCOUNTER FOR CLOSED FRACTURE: Chronic | Status: ACTIVE | Noted: 2023-04-11

## 2023-04-19 PROCEDURE — 73110 X-RAY EXAM OF WRIST: CPT | Mod: RT

## 2023-04-19 PROCEDURE — 99024 POSTOP FOLLOW-UP VISIT: CPT

## 2023-04-19 PROCEDURE — 29075 APPL CST ELBW FNGR SHORT ARM: CPT | Mod: RT

## 2023-04-19 NOTE — END OF VISIT
[FreeTextEntry3] : This note was written by Andreia Paulino on 04/19/2023 acting solely as a scribe for Dr. Judson Pelletier.\par  \par All medical record entries made by the Scribe were at my, Dr. Judson Pelletier, direction and personally dictated by me on 04/19/2023. I have personally reviewed the chart and agree that the record accurately reflects my personal performance of the history, physical exam, assessment and plan.
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
## 2023-04-19 NOTE — PHYSICAL EXAM
[de-identified] : Examination of his right wrist and hand after the splint and dressing were removed demonstrates his incision to be clean and dry.  There is no drainage or evidence of infection.  There is swelling.  He has limitation of flexion and extension of the digits.  He has normal sensation distally along the radial, ulnar and median nerve distributions. [de-identified] : PA, lateral, oblique and 20 degree tilt lateral views of his right wrist demonstrate his distal radius fracture and hardware to be in acceptable alignment.  Again, this was a highly comminuted fracture with an intra-articular component and loss of articular surface ulnarly.  In addition, as noted previously, the volar plate had to be placed quite distal to stabilize the articular surface, knowing that this will need to be removed in the future.

## 2023-04-19 NOTE — DISCUSSION/SUMMARY
[FreeTextEntry1] : He was placed into a well-padded and well molded right short arm fiberglass cast.  He was instructed on cast care and activity the patient range of motion exercises of the digits.  Finally, I recommended he begin a course of Celebrex 200 mg, once daily with meals. I warned of potential GI side effects.  He will follow-up in 2 weeks.\par \par I did discuss with him that the plates will need to be removed electively once the fractures have fully healed, as the volar plate had to be placed distally to fixate the fracture.  I told him that this will need to be removed electively to prevent attritional rupture of the FPL another flexor tendons.

## 2023-04-19 NOTE — ADDENDUM
[FreeTextEntry1] : I, Andreia Paulino, acted solely as a scribe for Dr. Pelletier on this date on 04/19/2023.

## 2023-05-05 ENCOUNTER — APPOINTMENT (OUTPATIENT)
Dept: ORTHOPEDIC SURGERY | Facility: CLINIC | Age: 43
End: 2023-05-05
Payer: OTHER MISCELLANEOUS

## 2023-05-05 PROCEDURE — 99024 POSTOP FOLLOW-UP VISIT: CPT

## 2023-05-05 PROCEDURE — 73110 X-RAY EXAM OF WRIST: CPT | Mod: RT

## 2023-05-05 NOTE — DISCUSSION/SUMMARY
[FreeTextEntry1] : At this time, he was fitted with a right carpal tunnel splint to be worn near full time. He may remove the brace when at home for range of motion exercises. He was instructed on scar massage and desensitization as well as more aggressive flexion and extension exercises. He was referred for hand therapy and provided with the appropriate referral. Authorization for worker's compensation approval of the hand therapy will be requested. He will follow up in 2 weeks.

## 2023-05-05 NOTE — END OF VISIT
[FreeTextEntry3] : This note was written by Andreia Paulino on 05/05/2023 acting solely as a scribe for Dr. Judson Pelletier.\par  \par All medical record entries made by the Scribe were at my, Dr. Judson Pelletier, direction and personally dictated by me on 05/05/2023. I have personally reviewed the chart and agree that the record accurately reflects my personal performance of the history, physical exam, assessment and plan.

## 2023-05-05 NOTE — ADDENDUM
[FreeTextEntry1] : I, Andreia Paulino, acted solely as a scribe for Dr. Pelletier on this date on 05/05/2023.

## 2023-05-05 NOTE — PHYSICAL EXAM
[de-identified] : Examination of his right wrist and hand after the cast was removed demonstrates his incision to be healing well.  There is decreased swelling.  He has limitation of flexion and extension of the digits which is somewhat improved.  He has normal sensation distally along the radial, ulnar and median nerve distributions. [de-identified] : PA, lateral, oblique and 20 degree tilt lateral views of his right wrist demonstrate his distal radius fracture and hardware to be in acceptable alignment.  Again, this was a highly comminuted fracture with an intra-articular component and loss of articular surface ulnarly.  In addition, as noted previously, the volar plate had to be placed quite distal to stabilize the articular surface, knowing that this will need to be removed in the future.  The fracture is healing.
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
## 2023-05-10 ENCOUNTER — NON-APPOINTMENT (OUTPATIENT)
Age: 43
End: 2023-05-10

## 2023-05-19 ENCOUNTER — APPOINTMENT (OUTPATIENT)
Dept: ORTHOPEDIC SURGERY | Facility: CLINIC | Age: 43
End: 2023-05-19
Payer: OTHER MISCELLANEOUS

## 2023-05-19 PROCEDURE — 99024 POSTOP FOLLOW-UP VISIT: CPT

## 2023-05-19 PROCEDURE — 73110 X-RAY EXAM OF WRIST: CPT | Mod: RT

## 2023-05-19 NOTE — PHYSICAL EXAM
[de-identified] : Examination of his right wrist and hand demonstrates his incision to be healing well.  There is decreased swelling.  He has improved flexion and extension of the digits.  He has normal sensation distally along the radial, ulnar and median nerve distributions. [de-identified] : PA, lateral, oblique and 20 degree tilt lateral views of his right wrist demonstrate his distal radius fracture and hardware to be in acceptable alignment.  Again, this was a highly comminuted fracture with an intra-articular component and loss of articular surface ulnarly.  In addition, as noted previously, the volar plate had to be placed quite distal to stabilize the articular surface, knowing that this will need to be removed in the future.  The fracture is healing as expected.

## 2023-05-19 NOTE — ADDENDUM
[FreeTextEntry1] : I, Andreia Paulino, acted solely as a scribe for Dr. Pelletier on this date on 05/19/2023.
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
## 2023-05-19 NOTE — END OF VISIT
[FreeTextEntry3] : This note was written by Andreia Paulino on 05/19/2023 acting solely as a scribe for Dr. Judson Pelletier.\par  \par All medical record entries made by the Scribe were at my, Dr. Judson Pelletier, direction and personally dictated by me on 05/19/2023. I have personally reviewed the chart and agree that the record accurately reflects my personal performance of the history, physical exam, assessment and plan.

## 2023-05-31 ENCOUNTER — NON-APPOINTMENT (OUTPATIENT)
Age: 43
End: 2023-05-31

## 2023-06-09 ENCOUNTER — APPOINTMENT (OUTPATIENT)
Dept: ORTHOPEDIC SURGERY | Facility: CLINIC | Age: 43
End: 2023-06-09
Payer: OTHER MISCELLANEOUS

## 2023-06-09 PROCEDURE — 73110 X-RAY EXAM OF WRIST: CPT | Mod: RT

## 2023-06-09 PROCEDURE — 99024 POSTOP FOLLOW-UP VISIT: CPT

## 2023-06-09 NOTE — DISCUSSION/SUMMARY
[FreeTextEntry1] : At this time, he was instructed on continued hand therapy in addition to home exercise program.  He was instructed on aggressive range of motion exercises to the digits.  He was provided with an updated referral to hand therapy.  Given he has only been approved for one session of hand therapy weekly and has thus far only had one session, I did tell him that my office will reach out to worker's compensation board for authorization of further visits. He will follow up in 4 weeks to assess his progress and further discuss hardware removal.\par \par With regard to removal of the plate, I did tell him that the fracture appears to be solidly healed on xray. With that being said, I would like to wait to remove the plate until he gets better motion and function of his wrist and hand. Again, he will continue with more aggressive hand therapy and will follow up in 4 weeks.

## 2023-06-09 NOTE — END OF VISIT
[FreeTextEntry3] : This note was written by Andreia Paulino on 06/09/2023 acting solely as a scribe for Dr. Judson Pelletier.\par  \par All medical record entries made by the Scribe were at my, Dr. Judson Pelletier, direction and personally dictated by me on 06/09/2023. I have personally reviewed the chart and agree that the record accurately reflects my personal performance of the history, physical exam, assessment and plan.
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
## 2023-06-09 NOTE — PHYSICAL EXAM
[de-identified] : Examination of his right wrist and hand demonstrates his incision to be well-healed.  There is decreased swelling.  He has improved flexion and extension of the digits.  He has approximately 15 degrees of wrist flexion and extension.  He has normal sensation distally along the radial, ulnar and median nerve distributions. [de-identified] : PA, lateral, oblique and 20 degree tilt lateral views of his right wrist demonstrate his distal radius fracture and hardware to be in acceptable alignment.  Again, this was a highly comminuted fracture with an intra-articular component and loss of articular surface ulnarly.  In addition, as noted previously, the volar plate had to be placed quite distal to stabilize the articular surface, knowing that this will need to be removed in the future.  The fracture is fully healed.

## 2023-06-09 NOTE — ADDENDUM
[FreeTextEntry1] : I, Andreia Paulino, acted solely as a scribe for Dr. Pelletier on this date on 06/09/2023.

## 2023-06-23 NOTE — ADDENDUM
[FreeTextEntry1] : I, Andreia Paulino, acted solely as a scribe for Dr. Pelletier on this date on 04/05/2023. 
Yes

## 2023-07-07 ENCOUNTER — APPOINTMENT (OUTPATIENT)
Dept: ORTHOPEDIC SURGERY | Facility: CLINIC | Age: 43
End: 2023-07-07
Payer: OTHER MISCELLANEOUS

## 2023-07-07 PROCEDURE — 99024 POSTOP FOLLOW-UP VISIT: CPT

## 2023-07-07 NOTE — END OF VISIT
[FreeTextEntry3] : This note was written by Andreia Paulino on 07/07/2023 acting solely as a scribe for Dr. Judson Pelleteir.\par  \par All medical record entries made by the Scribe were at my, Dr. Judson Pelletier, direction and personally dictated by me on 07/07/2023. I have personally reviewed the chart and agree that the record accurately reflects my personal performance of the history, physical exam, assessment and plan.
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
## 2023-07-07 NOTE — DISCUSSION/SUMMARY
[FreeTextEntry1] : At this time, he was instructed on continued hand therapy in addition to home exercise program.  He was instructed on aggressive range of motion exercises to the digits and will continue with hand therapy. He was provided with an updated referral. Furthermore, given he has continued stiffness to the digits, he was prescribed a Medrol Dosepak. I warned of potential side effects.\par \par Finally, with regard to removal of the plate, I did tell him once again, the fracture appeared to be essentially healed based off of his xrays obtained in June, at the time of his last office visit. I again discussed with him that the plates will need to be removed, as the volar plate had to be placed distally to reduce the fracture and thus, he is at increased risk for FPL tendon rupture and problems with his flexor tendons.  I am requesting authorization for removal of the 2 plates from his wrist and likely will plan on performing the surgery within the next month or so, or sometime in early September.  In the meantime, he was given an updated prescription for hand therapy.  He will follow-up in 4 weeks.

## 2023-07-07 NOTE — ADDENDUM
[FreeTextEntry1] : I, Andreia Paulnio, acted solely as a scribe for Dr. Pelletier on this date on 07/07/2023.

## 2023-07-07 NOTE — PHYSICAL EXAM
[de-identified] : Examination of his right wrist and hand demonstrates his incision to be well-healed.  There is decreased swelling.  He has improved flexion and extension of the digits.  He has approximately 30 degrees of wrist flexion and extension.  He has normal sensation distally along the radial, ulnar and median nerve distributions. [de-identified] : PA, lateral, oblique and 20 degree tilt lateral views of his right wrist dated 6/9/2023 demonstrated his distal radius fracture and hardware to be in acceptable alignment.  Again, this was a highly comminuted fracture with an intra-articular component and loss of articular surface ulnarly.  In addition, as noted previously, the volar plate had to be placed quite distal to stabilize the articular surface, knowing that this will need to be removed in the future.  The fracture is fully healed. No Exposure

## 2023-08-04 ENCOUNTER — APPOINTMENT (OUTPATIENT)
Dept: ORTHOPEDIC SURGERY | Facility: CLINIC | Age: 43
End: 2023-08-04
Payer: OTHER MISCELLANEOUS

## 2023-08-04 PROCEDURE — 99214 OFFICE O/P EST MOD 30 MIN: CPT
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
## 2023-08-04 NOTE — ADDENDUM
[FreeTextEntry1] : I, Andreia Paulino, acted solely as a scribe for Dr. Pelletier on this date on 08/04/2023.

## 2023-08-04 NOTE — PHYSICAL EXAM
[de-identified] : Examination of his right wrist and hand demonstrates his incision to be well-healed.  There is decreased swelling.  He has improved flexion and extension of the digits.  He has approximately 20 degrees of wrist flexion and 30 degrees of extension.  He has normal sensation distally along the radial, ulnar and median nerve distributions. [de-identified] : PA, lateral, oblique and 20 degree tilt lateral views of his right wrist dated 6/9/2023 demonstrated his distal radius fracture and hardware to be in acceptable alignment.  Again, this was a highly comminuted fracture with an intra-articular component and loss of articular surface ulnarly.  In addition, as noted previously, the volar plate had to be placed quite distal to stabilize the articular surface, knowing that this will need to be removed in the future.  The fracture is fully healed.

## 2023-08-04 NOTE — DISCUSSION/SUMMARY
[FreeTextEntry1] : I had a discussion regarding today's visit, the diagnosis and treatment recommendations and options.  We also discussed changes since the last visit.  At this time, the fracture appears to be essentially healed based off of his prior xrays. I again discussed with him that the plates will need to be removed, as the volar plate had to be placed distally to reduce the fracture and thus, he is at increased risk for FPL tendon rupture and problems with his flexor tendons. Authorization for the surgery was previously requested. I told him I will speak with my  and have her follow up on the request. Again, I am requesting authorization for removal of the 2 plates from his wrist. All of the risks and benefits have been reviewed in great detail with the patient and he is in agreement.  In addition, I am requesting authorization for further therapy, particularly postoperative after plate removal.  I spoke to my  and she states that neither further therapy nor the surgical procedure have yet been authorized.  We will look into this further, as the plates do need to be removed at this point in time.  -  The nature and purposes of the operation/procedure was discussed in detail.  I discussed the surgical procedure in detail, as well as expected postoperative recovery and outcome. -  Possible risks, benefits, and complications (from known and unknown causes) of the procedure were discussed in detail.   -  Possible non-operative alternatives to the proposed treatment were discussed in detail.   -  He was told that possible risks/complications include, but are not limited to:  Infection, nerve or vessel injury, stiffness, painful scar, poor outcome, need for additional surgical procedures, and other unforeseen complications.   -  In addition, the possibility of an "unsuccessful outcome," despite "successful surgery," was discussed with the patient.  Specifically, we discussed the potential chance that the screws may be stripped and all the hardware may not be able to be fully removed. -  The patient fully understands these risks and wishes to proceed.   -  I had a lengthy discussion with the patient regarding today's visit, the diagnosis, and my surgical treatment recommendations.  The patient has agreed to this plan of management and has expressed full understanding.  All questions were fully answered to the patient's satisfaction.   My cumulative time spent on today's visit was greater than 30 minutes and included: Preparation for the visit, review of the medical records, review of pertinent diagnostic studies, examination and counseling of the patient on the above diagnosis, treatment plan and prognosis, orders of diagnostic tests, medications and/or appropriate procedures and documentation in the medical records of today's visit.

## 2023-08-04 NOTE — END OF VISIT
[FreeTextEntry3] : This note was written by Andreia Paulino on 08/04/2023 acting solely as a scribe for Dr. Judson Pelletier.   All medical record entries made by the Scribe were at my, Dr. Judson Pelletier, direction and personally dictated by me on 08/04/2023. I have personally reviewed the chart and agree that the record accurately reflects my personal performance of the history, physical exam, assessment and plan.

## 2023-09-08 ENCOUNTER — OUTPATIENT (OUTPATIENT)
Dept: OUTPATIENT SERVICES | Facility: HOSPITAL | Age: 43
LOS: 1 days | End: 2023-09-08
Payer: COMMERCIAL

## 2023-09-08 VITALS
TEMPERATURE: 98 F | OXYGEN SATURATION: 98 % | DIASTOLIC BLOOD PRESSURE: 88 MMHG | HEART RATE: 70 BPM | WEIGHT: 313.72 LBS | HEIGHT: 69.5 IN | RESPIRATION RATE: 16 BRPM | SYSTOLIC BLOOD PRESSURE: 127 MMHG

## 2023-09-08 DIAGNOSIS — Z96.9 PRESENCE OF FUNCTIONAL IMPLANT, UNSPECIFIED: ICD-10-CM

## 2023-09-08 DIAGNOSIS — Z91.89 OTHER SPECIFIED PERSONAL RISK FACTORS, NOT ELSEWHERE CLASSIFIED: ICD-10-CM

## 2023-09-08 DIAGNOSIS — Z98.890 OTHER SPECIFIED POSTPROCEDURAL STATES: Chronic | ICD-10-CM

## 2023-09-08 DIAGNOSIS — S52.501P UNSPECIFIED FRACTURE OF THE LOWER END OF RIGHT RADIUS, SUBSEQUENT ENCOUNTER FOR CLOSED FRACTURE WITH MALUNION: ICD-10-CM

## 2023-09-08 DIAGNOSIS — S52.501A UNSPECIFIED FRACTURE OF THE LOWER END OF RIGHT RADIUS, INITIAL ENCOUNTER FOR CLOSED FRACTURE: ICD-10-CM

## 2023-09-08 LAB
ALBUMIN SERPL ELPH-MCNC: 3.8 G/DL — SIGNIFICANT CHANGE UP (ref 3.3–5)
ALP SERPL-CCNC: 89 U/L — SIGNIFICANT CHANGE UP (ref 30–120)
ALT FLD-CCNC: 75 U/L — HIGH (ref 10–60)
ANION GAP SERPL CALC-SCNC: 12 MMOL/L — SIGNIFICANT CHANGE UP (ref 5–17)
AST SERPL-CCNC: 46 U/L — HIGH (ref 10–40)
BILIRUB SERPL-MCNC: 0.7 MG/DL — SIGNIFICANT CHANGE UP (ref 0.2–1.2)
BUN SERPL-MCNC: 9 MG/DL — SIGNIFICANT CHANGE UP (ref 7–23)
CALCIUM SERPL-MCNC: 9.6 MG/DL — SIGNIFICANT CHANGE UP (ref 8.4–10.5)
CHLORIDE SERPL-SCNC: 100 MMOL/L — SIGNIFICANT CHANGE UP (ref 96–108)
CO2 SERPL-SCNC: 28 MMOL/L — SIGNIFICANT CHANGE UP (ref 22–31)
CREAT SERPL-MCNC: 0.74 MG/DL — SIGNIFICANT CHANGE UP (ref 0.5–1.3)
EGFR: 115 ML/MIN/1.73M2 — SIGNIFICANT CHANGE UP
GLUCOSE SERPL-MCNC: 81 MG/DL — SIGNIFICANT CHANGE UP (ref 70–99)
HCT VFR BLD CALC: 45 % — SIGNIFICANT CHANGE UP (ref 39–50)
HGB BLD-MCNC: 14.2 G/DL — SIGNIFICANT CHANGE UP (ref 13–17)
MCHC RBC-ENTMCNC: 28.2 PG — SIGNIFICANT CHANGE UP (ref 27–34)
MCHC RBC-ENTMCNC: 31.6 GM/DL — LOW (ref 32–36)
MCV RBC AUTO: 89.5 FL — SIGNIFICANT CHANGE UP (ref 80–100)
NRBC # BLD: 0 /100 WBCS — SIGNIFICANT CHANGE UP (ref 0–0)
PLATELET # BLD AUTO: 394 K/UL — SIGNIFICANT CHANGE UP (ref 150–400)
POTASSIUM SERPL-MCNC: 3.9 MMOL/L — SIGNIFICANT CHANGE UP (ref 3.5–5.3)
POTASSIUM SERPL-SCNC: 3.9 MMOL/L — SIGNIFICANT CHANGE UP (ref 3.5–5.3)
PROT SERPL-MCNC: 9.3 G/DL — HIGH (ref 6–8.3)
RBC # BLD: 5.03 M/UL — SIGNIFICANT CHANGE UP (ref 4.2–5.8)
RBC # FLD: 13.2 % — SIGNIFICANT CHANGE UP (ref 10.3–14.5)
SODIUM SERPL-SCNC: 140 MMOL/L — SIGNIFICANT CHANGE UP (ref 135–145)
WBC # BLD: 8.65 K/UL — SIGNIFICANT CHANGE UP (ref 3.8–10.5)
WBC # FLD AUTO: 8.65 K/UL — SIGNIFICANT CHANGE UP (ref 3.8–10.5)

## 2023-09-08 PROCEDURE — 93010 ELECTROCARDIOGRAM REPORT: CPT

## 2023-09-08 PROCEDURE — 80053 COMPREHEN METABOLIC PANEL: CPT

## 2023-09-08 PROCEDURE — 93005 ELECTROCARDIOGRAM TRACING: CPT

## 2023-09-08 PROCEDURE — 85027 COMPLETE CBC AUTOMATED: CPT

## 2023-09-08 PROCEDURE — G0463: CPT

## 2023-09-08 PROCEDURE — 36415 COLL VENOUS BLD VENIPUNCTURE: CPT
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
## 2023-09-08 NOTE — H&P PST ADULT - MUSCULOSKELETAL
details… right wrist/no joint swelling/no joint erythema/no joint warmth/no calf tenderness/decreased ROM/decreased ROM due to pain/decreased strength

## 2023-09-08 NOTE — H&P PST ADULT - NSICDXPASTMEDICALHX_GEN_ALL_CORE_FT
PAST MEDICAL HISTORY:  Class 3 severe obesity with body mass index (BMI) of 45.0 to 49.9 in adult     Distal radius fracture, right     Uses Montserratian as primary spoken language      PAST MEDICAL HISTORY:  2019 novel coronavirus disease (COVID-19)     Class 3 severe obesity with body mass index (BMI) of 45.0 to 49.9 in adult     Distal radius fracture, right     Uses Faroese as primary spoken language

## 2023-09-08 NOTE — H&P PST ADULT - LAST CARDIAC ANGIOGRAM/IMAGING
Pt asked for sample of Ranexa 500mg until he sees Dr Linn Barker, then he will get a script and will most likely need a tier exception
no

## 2023-09-08 NOTE — H&P PST ADULT - PROBLEM SELECTOR PLAN 1
Removal of Plates and Screws from RIGHT Wrist on 09/28/2023 Removal of Plates and Screws from RIGHT Wrist on 09/28/2023  Pt does not have a PMD. BMI 45.7 and abnl EKG- Medical Clearance will be required.  [facility] ([facility]) will arrange for a provider to do Medical Clearance. They will call pt.  Pt will inform PST of name, #, fax and appt information.  NPO as per Anesthesia- no meds on AM of surgery  Hold Advil starting on 9/21. Tylenol prn for pain  Instructions reviewed and questions addressed.

## 2023-09-08 NOTE — H&P PST ADULT - PROBLEM SELECTOR PROBLEM 2
Unspecified fracture of the lower end of right radius, subsequent encounter for closed fracture with malunion

## 2023-09-08 NOTE — H&P PST ADULT - NSICDXPROCEDURE_GEN_ALL_CORE_FT
PROCEDURES:  Removal of deeply implanted hardware from hand or wrist 08-Sep-2023 13:09:32  Airam Newsome

## 2023-09-11 DIAGNOSIS — Z00.00 ENCOUNTER FOR GENERAL ADULT MEDICAL EXAMINATION W/OUT ABNORMAL FINDINGS: ICD-10-CM

## 2023-09-12 ENCOUNTER — APPOINTMENT (OUTPATIENT)
Dept: INTERNAL MEDICINE | Facility: CLINIC | Age: 43
End: 2023-09-12
Payer: OTHER MISCELLANEOUS

## 2023-09-12 VITALS
HEIGHT: 70 IN | DIASTOLIC BLOOD PRESSURE: 88 MMHG | SYSTOLIC BLOOD PRESSURE: 138 MMHG | OXYGEN SATURATION: 97 % | RESPIRATION RATE: 14 BRPM | HEART RATE: 82 BPM | WEIGHT: 313 LBS | TEMPERATURE: 98.4 F | BODY MASS INDEX: 44.81 KG/M2

## 2023-09-12 DIAGNOSIS — Z01.818 ENCOUNTER FOR OTHER PREPROCEDURAL EXAMINATION: ICD-10-CM

## 2023-09-12 DIAGNOSIS — Z87.891 PERSONAL HISTORY OF NICOTINE DEPENDENCE: ICD-10-CM

## 2023-09-12 PROCEDURE — 99214 OFFICE O/P EST MOD 30 MIN: CPT | Mod: 25

## 2023-09-21 ENCOUNTER — NON-APPOINTMENT (OUTPATIENT)
Age: 43
End: 2023-09-21

## 2023-09-25 RX ORDER — CHLORHEXIDINE GLUCONATE 213 G/1000ML
1 SOLUTION TOPICAL DAILY
Refills: 0 | Status: DISCONTINUED | OUTPATIENT
Start: 2023-09-28 | End: 2023-10-12

## 2023-09-27 ENCOUNTER — TRANSCRIPTION ENCOUNTER (OUTPATIENT)
Age: 43
End: 2023-09-27

## 2023-09-28 ENCOUNTER — TRANSCRIPTION ENCOUNTER (OUTPATIENT)
Age: 43
End: 2023-09-28

## 2023-09-28 ENCOUNTER — OUTPATIENT (OUTPATIENT)
Dept: OUTPATIENT SERVICES | Facility: HOSPITAL | Age: 43
LOS: 1 days | End: 2023-09-28
Payer: COMMERCIAL

## 2023-09-28 ENCOUNTER — APPOINTMENT (OUTPATIENT)
Dept: ORTHOPEDIC SURGERY | Facility: HOSPITAL | Age: 43
End: 2023-09-28

## 2023-09-28 VITALS
SYSTOLIC BLOOD PRESSURE: 116 MMHG | HEART RATE: 75 BPM | OXYGEN SATURATION: 99 % | WEIGHT: 308.65 LBS | RESPIRATION RATE: 17 BRPM | HEIGHT: 70 IN | DIASTOLIC BLOOD PRESSURE: 75 MMHG | TEMPERATURE: 97 F

## 2023-09-28 VITALS
DIASTOLIC BLOOD PRESSURE: 68 MMHG | HEART RATE: 69 BPM | RESPIRATION RATE: 14 BRPM | OXYGEN SATURATION: 96 % | SYSTOLIC BLOOD PRESSURE: 125 MMHG

## 2023-09-28 DIAGNOSIS — Z98.890 OTHER SPECIFIED POSTPROCEDURAL STATES: Chronic | ICD-10-CM

## 2023-09-28 DIAGNOSIS — S52.501A UNSPECIFIED FRACTURE OF THE LOWER END OF RIGHT RADIUS, INITIAL ENCOUNTER FOR CLOSED FRACTURE: ICD-10-CM

## 2023-09-28 PROCEDURE — 20680 REMOVAL OF IMPLANT DEEP: CPT

## 2023-09-28 PROCEDURE — 76000 FLUOROSCOPY <1 HR PHYS/QHP: CPT

## 2023-09-28 PROCEDURE — 20680 REMOVAL OF IMPLANT DEEP: CPT | Mod: RT

## 2023-09-28 RX ORDER — CELECOXIB 200 MG/1
1 CAPSULE ORAL
Qty: 20 | Refills: 1
Start: 2023-09-28 | End: 2023-10-17

## 2023-09-28 RX ORDER — IBUPROFEN 200 MG
2 TABLET ORAL
Refills: 0 | DISCHARGE

## 2023-09-28 RX ORDER — SODIUM CHLORIDE 9 MG/ML
1000 INJECTION, SOLUTION INTRAVENOUS
Refills: 0 | Status: DISCONTINUED | OUTPATIENT
Start: 2023-09-28 | End: 2023-09-28

## 2023-09-28 RX ORDER — CEFAZOLIN SODIUM 1 G
3000 VIAL (EA) INJECTION ONCE
Refills: 0 | Status: COMPLETED | OUTPATIENT
Start: 2023-09-28 | End: 2023-09-28

## 2023-09-28 RX ORDER — OXYCODONE AND ACETAMINOPHEN 5; 325 MG/1; MG/1
1 TABLET ORAL ONCE
Refills: 0 | Status: DISCONTINUED | OUTPATIENT
Start: 2023-09-28 | End: 2023-09-28

## 2023-09-28 RX ORDER — APREPITANT 80 MG/1
40 CAPSULE ORAL ONCE
Refills: 0 | Status: COMPLETED | OUTPATIENT
Start: 2023-09-28 | End: 2023-09-28

## 2023-09-28 RX ORDER — ONDANSETRON 8 MG/1
4 TABLET, FILM COATED ORAL ONCE
Refills: 0 | Status: DISCONTINUED | OUTPATIENT
Start: 2023-09-28 | End: 2023-09-28

## 2023-09-28 RX ORDER — HYDROMORPHONE HYDROCHLORIDE 2 MG/ML
0.5 INJECTION INTRAMUSCULAR; INTRAVENOUS; SUBCUTANEOUS
Refills: 0 | Status: DISCONTINUED | OUTPATIENT
Start: 2023-09-28 | End: 2023-09-28

## 2023-09-28 RX ORDER — OXYCODONE AND ACETAMINOPHEN 5; 325 MG/1; MG/1
1 TABLET ORAL
Qty: 20 | Refills: 0
Start: 2023-09-28

## 2023-09-28 RX ADMIN — APREPITANT 40 MILLIGRAM(S): 80 CAPSULE ORAL at 09:53

## 2023-09-28 RX ADMIN — CHLORHEXIDINE GLUCONATE 1 APPLICATION(S): 213 SOLUTION TOPICAL at 09:56

## 2023-09-28 RX ADMIN — SODIUM CHLORIDE 75 MILLILITER(S): 9 INJECTION, SOLUTION INTRAVENOUS at 12:13

## 2023-09-28 RX ADMIN — HYDROMORPHONE HYDROCHLORIDE 0.5 MILLIGRAM(S): 2 INJECTION INTRAMUSCULAR; INTRAVENOUS; SUBCUTANEOUS at 12:38

## 2023-09-28 RX ADMIN — HYDROMORPHONE HYDROCHLORIDE 0.5 MILLIGRAM(S): 2 INJECTION INTRAMUSCULAR; INTRAVENOUS; SUBCUTANEOUS at 12:13

## 2023-09-28 NOTE — ASU PATIENT PROFILE, ADULT - NSICDXPASTMEDICALHX_GEN_ALL_CORE_FT
PAST MEDICAL HISTORY:  2019 novel coronavirus disease (COVID-19)     Class 3 severe obesity with body mass index (BMI) of 45.0 to 49.9 in adult     Distal radius fracture, right     Uses Kyrgyz as primary spoken language

## 2023-09-28 NOTE — ASU DISCHARGE PLAN (ADULT/PEDIATRIC) - CALL YOUR DOCTOR IF YOU HAVE ANY OF THE FOLLOWING:
Swelling that gets worse/Pain not relieved by Medications Bleeding that does not stop/Swelling that gets worse/Pain not relieved by Medications/Fever greater than (need to indicate Fahrenheit or Celsius)/Wound/Surgical Site with redness, or foul smelling discharge or pus/Numbness, tingling, color or temperature change to extremity

## 2023-09-28 NOTE — ASU DISCHARGE PLAN (ADULT/PEDIATRIC) - ACTIVITY LEVEL
No heavy lifting/No sports/gym/Elevate extremity sling/No heavy lifting/No sports/gym/Elevate extremity

## 2023-09-28 NOTE — ASU PATIENT PROFILE, ADULT - AS SC BRADEN NUTRITION
(4) excellent family at bedside, patient and family are refusing a dilantin or keppra load. mother notes patient has tried multiple medications for seizure, and they don't work. patient notes she is feeling much better. she just took a calculus test and has been under a lot of stress.

## 2023-09-28 NOTE — ASU DISCHARGE PLAN (ADULT/PEDIATRIC) - CARE PROVIDER_API CALL
Judson Pelletier)  Orthopaedic Surgery; Surgery of the Hand  [address], [address]  [place] [address]  Phone: [phone]  Fax: [phone]  Established Patient  Follow Up Time:

## 2023-09-28 NOTE — ASU DISCHARGE PLAN (ADULT/PEDIATRIC) - ASU DC SPECIAL INSTRUCTIONSFT
Elevate [  Right ] arm in sling daily when up & walking.  Elevate the  hand/arm above heart level on pillow/blankets when lying down.  Pad the neck strap with athletic sock/collared shirt.  Apply ice paks to top of [ Left / Right ] hand for 30 minutes every 3 hours daily.  Keep bandage & Splint clean, dry , & intact daily. Cover in shower with cast bag or plastic bag and tape.  Call the Dr.  for fever, severe pain, fall or hand injury.  Call for an appointment for office visit  in 10-14 days. Elevate [  Right ] arm in sling daily when up & walking.  Elevate the  hand/arm above heart level on pillow/blankets when lying down.  Pad the neck strap with athletic sock/collared shirt.  Apply ice pacfks to top of Right ] hand for 30 minutes every 3 hours daily.  Keep bandage & Splint clean, dry , & intact daily. Cover in shower with cast bag or plastic bag and tape.  Call the Dr.  for fever, severe pain, fall or hand injury.  Call for an appointment for office visit next Wed in [place]

## 2023-10-02 PROBLEM — Z96.9 RETAINED ORTHOPEDIC HARDWARE: Status: ACTIVE | Noted: 2023-07-07

## 2023-10-02 PROBLEM — U07.1 COVID-19: Chronic | Status: ACTIVE | Noted: 2023-09-08

## 2023-10-02 PROBLEM — E66.01 MORBID (SEVERE) OBESITY DUE TO EXCESS CALORIES: Chronic | Status: ACTIVE | Noted: 2023-09-08

## 2023-10-06 ENCOUNTER — APPOINTMENT (OUTPATIENT)
Dept: ORTHOPEDIC SURGERY | Facility: CLINIC | Age: 43
End: 2023-10-06
Payer: OTHER MISCELLANEOUS

## 2023-10-06 DIAGNOSIS — Z96.9 PRESENCE OF FUNCTIONAL IMPLANT, UNSPECIFIED: ICD-10-CM

## 2023-10-06 PROCEDURE — 99024 POSTOP FOLLOW-UP VISIT: CPT

## 2023-10-18 ENCOUNTER — NON-APPOINTMENT (OUTPATIENT)
Age: 43
End: 2023-10-18

## 2023-10-27 ENCOUNTER — APPOINTMENT (OUTPATIENT)
Dept: ORTHOPEDIC SURGERY | Facility: CLINIC | Age: 43
End: 2023-10-27
Payer: OTHER MISCELLANEOUS

## 2023-10-27 PROCEDURE — 99024 POSTOP FOLLOW-UP VISIT: CPT

## 2023-11-10 ENCOUNTER — NON-APPOINTMENT (OUTPATIENT)
Age: 43
End: 2023-11-10

## 2023-11-22 ENCOUNTER — APPOINTMENT (OUTPATIENT)
Dept: ORTHOPEDIC SURGERY | Facility: CLINIC | Age: 43
End: 2023-11-22
Payer: OTHER MISCELLANEOUS

## 2023-11-22 PROCEDURE — 99024 POSTOP FOLLOW-UP VISIT: CPT

## 2023-12-23 ENCOUNTER — NON-APPOINTMENT (OUTPATIENT)
Age: 43
End: 2023-12-23
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
## 2023-12-31 NOTE — PHYSICAL EXAM
[de-identified] : Examination of his right wrist and hand demonstrates his incision to be healing well.  There is decreased swelling.  He has full flexion and extension of the digits.  He has approximately 40 degrees of wrist flexion and 30 degrees of extension.  He has 75 degrees of pronation supination.  He is neurovascularly intact distally.

## 2023-12-31 NOTE — DISCUSSION/SUMMARY
[FreeTextEntry1] : He was instructed on continued range of motion exercises and strengthening.  He will advance his activities, according to his symptoms.  He will follow-up

## 2024-01-08 ENCOUNTER — APPOINTMENT (OUTPATIENT)
Dept: ORTHOPEDIC SURGERY | Facility: CLINIC | Age: 44
End: 2024-01-08
Payer: OTHER MISCELLANEOUS

## 2024-01-08 PROCEDURE — 99213 OFFICE O/P EST LOW 20 MIN: CPT

## 2024-01-08 NOTE — END OF VISIT
[FreeTextEntry3] : This note was written by Coty Duran on 01/08/2024 acting solely as a scribe for Dr. Judson Pelletier.   All medical record entries made by the Scribe were at my, Dr. Judson Pelletier, direction and personally dictated by me on 01/08/2024. I have personally reviewed the chart and agree that the record accurately reflects my personal performance of the history, physical exam, assessment and plan.

## 2024-01-08 NOTE — PHYSICAL EXAM
[de-identified] : - Constitutional: This is a male in no obvious distress.   - Psych: Patient is alert and oriented to person, place and time.  Patient has a normal mood and affect. - Cardiovascular: Normal pulses throughout the upper extremities.  No significant varicosities are noted in the upper extremities.  - Neuro: Strength and sensation are intact throughout the upper extremities.  Patient has normal coordination.  ---  Examination of his right wrist and hand demonstrates his incision to be well-healed.  There is decreased swelling.  He can flex the digits into the palm with mild loss of terminal flexion with full extension of the digits.  He has approximately 45 degrees of wrist flexion and 30 degrees of extension.  He has 75 degrees of pronation and supination.  He is neurovascularly intact distally.
Yes

## 2024-01-08 NOTE — DISCUSSION/SUMMARY
[FreeTextEntry1] : I had a discussion regarding today's visit, the diagnosis and treatment recommendations and options.  We also discussed changes since the last visit.  At this time, I recommended continued range of motion exercises and strengthening.  He will follow-up with me on an as-needed basis.  The patient has agreed to the above plan of management and has expressed full understanding.  All questions were fully answered to the patient's satisfaction.  My cumulative time spent on today's visit was greater than 30 minutes and included: Preparation for the visit, review of the medical records, review of pertinent diagnostic studies, examination and counseling of the patient on the above diagnosis, treatment plan and prognosis, orders of diagnostic tests, medications and/or appropriate procedures and documentation in the medical records of today's visit.
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
## 2024-01-08 NOTE — ADDENDUM
[FreeTextEntry1] : I, Coty Duran, acted solely as a scribe for Dr. Pelletier on this date on 01/08/2024.

## 2024-03-06 PROBLEM — S52.501A DISTAL RADIUS FRACTURE, RIGHT: Status: ACTIVE | Noted: 2023-03-29

## 2024-03-12 ENCOUNTER — APPOINTMENT (OUTPATIENT)
Dept: ORTHOPEDIC SURGERY | Facility: CLINIC | Age: 44
End: 2024-03-12
Payer: OTHER MISCELLANEOUS

## 2024-03-12 DIAGNOSIS — M77.8 OTHER ENTHESOPATHIES, NOT ELSEWHERE CLASSIFIED: ICD-10-CM

## 2024-03-12 DIAGNOSIS — S52.501A UNSPECIFIED FRACTURE OF THE LOWER END OF RIGHT RADIUS, INITIAL ENCOUNTER FOR CLOSED FRACTURE: ICD-10-CM

## 2024-03-12 PROCEDURE — 99214 OFFICE O/P EST MOD 30 MIN: CPT

## 2024-03-12 RX ORDER — IBUPROFEN 600 MG/1
600 TABLET, FILM COATED ORAL 3 TIMES DAILY
Qty: 30 | Refills: 1 | Status: ACTIVE | COMMUNITY
Start: 2024-03-12 | End: 1900-01-01

## 2024-03-12 NOTE — END OF VISIT
Called to discuss results and recommendations with patient per Miller Elena MD, a message was left for the patient to call back.     [FreeTextEntry3] : This note was written by Avelino York on 03/12/2024 acting solely as a scribe for Dr. Judson Pelletier.   All medical record entries made by the Scribe were at my, Dr. Judson Pelletier, direction and personally dictated by me on 03/12/2024. I have personally reviewed the chart and agree that the record accurately reflects my personal performance of the history, physical exam, assessment and plan.

## 2024-03-12 NOTE — DISCUSSION/SUMMARY
[FreeTextEntry1] : I had a discussion regarding today's visit, the diagnosis and treatment recommendations and options.  We also discussed changes since the last visit.  He has complaints of right shoulder and elbow pain consistent with probable tendinitis which he attributes to his immobilization in the sling and recovery.  At this time, I recommended continued range of motion exercises and strengthening. I provided a renewed script for therapy.  I also recommended the therapist work on his right shoulder and elbow.  He was also provided a prescription for ibuprofen 600 every 8 hours as needed.  I did recommend he not take this often, as it can upset his stomach.  He will follow-up in 2 months if needed.  The patient has agreed to the above plan of management and has expressed full understanding.  All questions were fully answered to the patient's satisfaction.  My cumulative time spent on today's visit was greater than 30 minutes and included: Preparation for the visit, review of the medical records, review of pertinent diagnostic studies, examination and counseling of the patient on the above diagnosis, treatment plan and prognosis, orders of diagnostic tests, medications and/or appropriate procedures and documentation in the medical records of today's visit.
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
## 2024-03-12 NOTE — ADDENDUM
[FreeTextEntry1] : I, Avelino York, acted solely as a scribe for Dr. Pelletier on this date on 03/12/2024.

## 2024-03-12 NOTE — PHYSICAL EXAM
[de-identified] : - Constitutional: This is a male in no obvious distress.   - Psych: Patient is alert and oriented to person, place and time.  Patient has a normal mood and affect. - Cardiovascular: Normal pulses throughout the upper extremities.  No significant varicosities are noted in the upper extremities.  - Neuro: Strength and sensation are intact throughout the upper extremities.  Patient has normal coordination.  ---  Examination of his right wrist and hand demonstrates his incision to be well-healed.  There is no obvious residual swelling.  He can flex the digits into the palm with mild loss of terminal flexion with full extension of the digits.  He has approximately 45 degrees of wrist flexion and 35 degrees of extension.  He has 75 degrees of pronation and supination.  He is neurovascularly intact distally.  Examination of his right shoulder and elbow demonstrates no obvious swelling or tenderness.  He has some pain with forward elevation.  There is negative drop arm test.

## 2024-10-10 ENCOUNTER — APPOINTMENT (OUTPATIENT)
Dept: ORTHOPEDIC SURGERY | Facility: CLINIC | Age: 44
End: 2024-10-10
Payer: OTHER MISCELLANEOUS

## 2024-10-10 DIAGNOSIS — M77.8 OTHER ENTHESOPATHIES, NOT ELSEWHERE CLASSIFIED: ICD-10-CM

## 2024-10-10 DIAGNOSIS — M65.331 TRIGGER FINGER, RIGHT MIDDLE FINGER: ICD-10-CM

## 2024-10-10 DIAGNOSIS — S52.501A UNSPECIFIED FRACTURE OF THE LOWER END OF RIGHT RADIUS, INITIAL ENCOUNTER FOR CLOSED FRACTURE: ICD-10-CM

## 2024-10-10 PROCEDURE — 73110 X-RAY EXAM OF WRIST: CPT | Mod: RT

## 2024-10-10 PROCEDURE — 99214 OFFICE O/P EST MOD 30 MIN: CPT

## 2024-10-10 PROCEDURE — 73130 X-RAY EXAM OF HAND: CPT | Mod: RT

## 2024-10-10 PROCEDURE — 73080 X-RAY EXAM OF ELBOW: CPT | Mod: RT

## 2024-11-06 ENCOUNTER — APPOINTMENT (OUTPATIENT)
Dept: ORTHOPEDIC SURGERY | Facility: CLINIC | Age: 44
End: 2024-11-06
Payer: OTHER MISCELLANEOUS

## 2024-11-06 DIAGNOSIS — S52.501A UNSPECIFIED FRACTURE OF THE LOWER END OF RIGHT RADIUS, INITIAL ENCOUNTER FOR CLOSED FRACTURE: ICD-10-CM

## 2024-11-06 DIAGNOSIS — M77.8 OTHER ENTHESOPATHIES, NOT ELSEWHERE CLASSIFIED: ICD-10-CM

## 2024-11-06 DIAGNOSIS — M65.331 TRIGGER FINGER, RIGHT MIDDLE FINGER: ICD-10-CM

## 2024-11-06 PROCEDURE — 99214 OFFICE O/P EST MOD 30 MIN: CPT

## 2025-06-04 NOTE — BRIEF OPERATIVE NOTE - NSICDXBRIEFOPLAUNCH_GEN_ALL_CORE
Situation:  Received call from patient for questions about home [facility]ida care.    Key Assessments:  Patient discussed that she is having shoulder surgery on 6/16 and will need OT and a home health aide to assist with showers. Patient will not have use of her left arm for an extended amount of time.     Actions Taken:  Discussed that [facility] (or preferred home health agency) OT and home health aid orders should be requested by provider at time of surgery as part of their discharge orders. AA will then reach out to patient to set up services. Patient prefers [facility] for services. It does appear that they service her area. No further questions or concerns.     Program Plan:   Active listening and support provided. Reviewed progress so far and plan for ongoing wellness. Discussed case closure. Patient will be followed by [facility] for another two weeks. She will then be followed again by the [facility] program for 30 days after discharge. Will close case due to extended CT coverage. Patient encouraged to reach out if she should have any future needs.     See hyperlinks within encounter for full documentation    
<--- Click to Launch ICDx for PreOp, PostOp and Procedure

## (undated) DEVICE — PACK UPPER EXTREMITY

## (undated) DEVICE — DRSG ACE BANDAGE 4"

## (undated) DEVICE — LAP PAD W RING 18 X 18"

## (undated) DEVICE — SYM-TOURNIQUET 3013LAAJ: Type: DURABLE MEDICAL EQUIPMENT

## (undated) DEVICE — VENODYNE/SCD SLEEVE CALF MEDIUM

## (undated) DEVICE — SUT MONOCRYL 5-0 18" P-3 UNDYED

## (undated) DEVICE — DRSG KLING 4"

## (undated) DEVICE — GLV 8 PROTEXIS (BLUE)

## (undated) DEVICE — NDL HYPO REGULAR BEVEL 25G X 1.5" (BLUE)

## (undated) DEVICE — WARMING BLANKET LOWER ADULT

## (undated) DEVICE — DRSG XEROFORM 5 X 9"

## (undated) DEVICE — SUT VICRYL 3-0 27" RB-1 UNDYED

## (undated) DEVICE — DRAPE C ARM MINI

## (undated) DEVICE — GLV 7.5 PROTEXIS (WHITE)

## (undated) DEVICE — GLV 7.5 PROTEXIS (BLUE)

## (undated) DEVICE — TOURNIQUET CUFF 18" DUAL PORT SINGLE BLADDER W PLC  (BLACK)

## (undated) DEVICE — DRSG STERISTRIPS 0.5 X 4"

## (undated) DEVICE — SUT MONOCRYL 4-0 18" P-3 UNDYED

## (undated) DEVICE — DRAPE TOWEL BLUE 17" X 24"

## (undated) DEVICE — WARMING BLANKET FULL ADULT

## (undated) DEVICE — DRSG WEBRIL 3"

## (undated) DEVICE — DRAPE 3/4 SHEET 52X76"

## (undated) DEVICE — SUT MONOCRYL 4-0 27" PS-2 UNDYED

## (undated) DEVICE — DRAPE SURGICAL #1010

## (undated) DEVICE — GLV 7 PROTEXIS (WHITE)

## (undated) DEVICE — TOURNIQUET ESMARK 4"

## (undated) DEVICE — DRSG MASTISOL

## (undated) DEVICE — PREP BETADINE KIT

## (undated) DEVICE — SUT CHROMIC 4-0 18" P-3

## (undated) DEVICE — SUT POLYSORB 2-0 36" GS-21 UNDYED

## (undated) DEVICE — POSITIONER BOOT CRADLE

## (undated) DEVICE — DRSG CURITY GAUZE SPONGE 4 X 4" 12-PLY

## (undated) DEVICE — SLING SHOULDER IMMOBILIZER CLINIC LARGE

## (undated) DEVICE — DRILL BIT MEDARTIS TWIST 2X40X91MM

## (undated) DEVICE — BLADE SCREWDRIVER 2.5/2.8MM HD7

## (undated) DEVICE — BLADE SCALPEL SAFETYLOCK #15